# Patient Record
Sex: MALE | NOT HISPANIC OR LATINO | Employment: FULL TIME | URBAN - METROPOLITAN AREA
[De-identification: names, ages, dates, MRNs, and addresses within clinical notes are randomized per-mention and may not be internally consistent; named-entity substitution may affect disease eponyms.]

---

## 2019-11-14 ENCOUNTER — OFFICE VISIT (OUTPATIENT)
Dept: FAMILY MEDICINE CLINIC | Facility: CLINIC | Age: 44
End: 2019-11-14

## 2019-11-14 VITALS
DIASTOLIC BLOOD PRESSURE: 68 MMHG | SYSTOLIC BLOOD PRESSURE: 124 MMHG | HEIGHT: 74 IN | HEART RATE: 64 BPM | BODY MASS INDEX: 31.32 KG/M2 | WEIGHT: 244 LBS

## 2019-11-14 DIAGNOSIS — Z02.89 ENCOUNTER FOR FEDERAL AVIATION ADMINISTRATION (FAA) EXAMINATION: Primary | ICD-10-CM

## 2019-11-14 PROCEDURE — 99499 UNLISTED E&M SERVICE: CPT | Performed by: FAMILY MEDICINE

## 2019-11-14 NOTE — PROGRESS NOTES
Chief Complaint   Patient presents with    3rd class faa     TJ#[id number] no ekg, no glasses, with out meds

## 2021-10-25 ENCOUNTER — OFFICE VISIT (OUTPATIENT)
Dept: FAMILY MEDICINE CLINIC | Facility: CLINIC | Age: 46
End: 2021-10-25

## 2021-10-25 VITALS
HEART RATE: 94 BPM | DIASTOLIC BLOOD PRESSURE: 80 MMHG | WEIGHT: 266 LBS | SYSTOLIC BLOOD PRESSURE: 130 MMHG | BODY MASS INDEX: 34.14 KG/M2 | HEIGHT: 74 IN

## 2021-10-25 DIAGNOSIS — Z02.89 ENCOUNTER FOR FEDERAL AVIATION ADMINISTRATION (FAA) EXAMINATION: Primary | ICD-10-CM

## 2021-10-25 PROCEDURE — 99499 UNLISTED E&M SERVICE: CPT | Performed by: FAMILY MEDICINE

## 2023-10-24 ENCOUNTER — OFFICE VISIT (OUTPATIENT)
Dept: FAMILY MEDICINE CLINIC | Facility: CLINIC | Age: 48
End: 2023-10-24

## 2023-10-24 VITALS
BODY MASS INDEX: 30.42 KG/M2 | HEART RATE: 85 BPM | SYSTOLIC BLOOD PRESSURE: 118 MMHG | HEIGHT: 74 IN | WEIGHT: 237 LBS | DIASTOLIC BLOOD PRESSURE: 66 MMHG

## 2023-10-24 DIAGNOSIS — Z02.89 ENCOUNTER FOR FEDERAL AVIATION ADMINISTRATION (FAA) EXAMINATION: Primary | ICD-10-CM

## 2023-10-24 PROCEDURE — 99499 UNLISTED E&M SERVICE: CPT | Performed by: FAMILY MEDICINE
